# Patient Record
(demographics unavailable — no encounter records)

---

## 2025-05-16 NOTE — HISTORY OF PRESENT ILLNESS
[FreeTextEntry1] : It's a pleasure to see Ms. NICK TRACEY In the office today. She is a 72 year -  old woman  who presents to the office today for the specific request of getting MRI of cervical/lumbar spine without gadolinium to evaluate her chronic cervical/lumbar radiculopathy for which she is currently under the care of of a pain management doctor in [place] maintained on oxycodone for the last 6 years.  She has seen neurosurgery with surgery recommended in the past but she declined.  She has not done any recent physical therapy and declines any additional medications.  Her blood pressure was noted to be very high even though she denies any symptoms.  She reports feeling very anxious coming to the office, and declines an ambulance to be called to take her to the emergency room.

## 2025-05-16 NOTE — PHYSICAL EXAM
[Person] : oriented to person [Place] : oriented to place [Time] : oriented to time [Concentration Intact] : normal concentrating ability [Visual Intact] : visual attention was ~T not ~L decreased [Naming Objects] : no difficulty naming common objects [Repeating Phrases] : no difficulty repeating a phrase [Writing A Sentence] : no difficulty writing a sentence [Fluency] : fluency intact [Comprehension] : comprehension intact [Reading] : reading intact [Past History] : adequate knowledge of personal past history [Cranial Nerves Optic (II)] : visual acuity intact bilaterally,  visual fields full to confrontation, pupils equal round and reactive to light [Cranial Nerves Oculomotor (III)] : extraocular motion intact [Cranial Nerves Trigeminal (V)] : facial sensation intact symmetrically [Cranial Nerves Facial (VII)] : face symmetrical [Cranial Nerves Vestibulocochlear (VIII)] : hearing was intact bilaterally [Cranial Nerves Glossopharyngeal (IX)] : tongue and palate midline [Cranial Nerves Accessory (XI - Cranial And Spinal)] : head turning and shoulder shrug symmetric [Cranial Nerves Hypoglossal (XII)] : there was no tongue deviation with protrusion [Motor Tone] : muscle tone was normal in all four extremities [Motor Strength] : muscle strength was normal in all four extremities [No Muscle Atrophy] : normal bulk in all four extremities [Sensation Tactile Decrease] : light touch was intact [Balance] : balance was intact [Past-pointing] : there was no past-pointing [Tremor] : no tremor present [1+] : Ankle jerk right 1+ [2+] : Ankle jerk left 2+ [Plantar Reflex Right Only] : normal on the right [Plantar Reflex Left Only] : normal on the left [FreeTextEntry6] : Limited range of motion in the neck and back, mild pain limited weakness in the extremities [FreeTextEntry8] : Antalgic gait

## 2025-05-16 NOTE — ASSESSMENT
[FreeTextEntry1] : cervical/lumbar radiculopathy - Will order MRI of C and L spine as requested - Insurance may require completion of physical therapy before approving MRI - Continue current pain management follow-up  Elevated Blood Pressure-white coat hypertension -Patient declined to be sent to emergency room - Patient advised to monitor blood pressure at home - Instructed to seek emergency care if BP remains severely elevated - Recommended follow-up with PCP for blood pressure management